# Patient Record
Sex: FEMALE | Race: WHITE
[De-identification: names, ages, dates, MRNs, and addresses within clinical notes are randomized per-mention and may not be internally consistent; named-entity substitution may affect disease eponyms.]

---

## 2017-04-04 ENCOUNTER — HOSPITAL ENCOUNTER (EMERGENCY)
Dept: HOSPITAL 75 - ER | Age: 32
Discharge: HOME | End: 2017-04-04
Payer: COMMERCIAL

## 2017-04-04 VITALS — BODY MASS INDEX: 25.68 KG/M2 | HEIGHT: 61 IN | WEIGHT: 136 LBS

## 2017-04-04 VITALS — SYSTOLIC BLOOD PRESSURE: 126 MMHG | DIASTOLIC BLOOD PRESSURE: 84 MMHG

## 2017-04-04 DIAGNOSIS — Z3A.01: ICD-10-CM

## 2017-04-04 DIAGNOSIS — O20.0: Primary | ICD-10-CM

## 2017-04-04 LAB
BILIRUB UR QL STRIP: NEGATIVE
KETONES UR QL STRIP: NEGATIVE
LEUKOCYTE ESTERASE UR QL STRIP: NEGATIVE
NITRITE UR QL STRIP: NEGATIVE
PH UR STRIP: 6 [PH] (ref 5–9)
PROT UR QL STRIP: NEGATIVE
SP GR UR STRIP: 1.02 (ref 1.02–1.02)
SQUAMOUS #/AREA URNS HPF: (no result) /HPF
UROBILINOGEN UR-MCNC: NORMAL MG/DL
WBC #/AREA URNS HPF: (no result) /HPF

## 2017-04-04 PROCEDURE — 81000 URINALYSIS NONAUTO W/SCOPE: CPT

## 2017-04-04 PROCEDURE — 84703 CHORIONIC GONADOTROPIN ASSAY: CPT

## 2017-04-04 PROCEDURE — 99282 EMERGENCY DEPT VISIT SF MDM: CPT

## 2017-04-04 PROCEDURE — 84702 CHORIONIC GONADOTROPIN TEST: CPT

## 2017-04-04 PROCEDURE — 36415 COLL VENOUS BLD VENIPUNCTURE: CPT

## 2017-04-04 NOTE — ED GU-FEMALE
General


Chief Complaint:  -Female


Stated Complaint:  POSS MISCARRIAGE


Nursing Triage Note:  


PT REPORTS SHE HAD A POS PREG TEST AT HOME.  BEGAN SPOTTING LAST NIGHT WITH 


HEAVIER BLEEDING UPON AWAKENING THIS MORNING.  ALSO C/O CRAMPING ET PELVIC 


HEAVINESS.


Nursing Sepsis Screen:  No Definite Risk


Source:  patient, RN notes reviewed


Exam Limitations:  no limitations





History of Present Illness


Time seen by provider:  04:28


Initial Comments


As above and below.


Timing/Duration:  this evening, getting worse


Severity/Quality:  mild, cramping


Location:  vaginal


Radiation:  none


Activities at Onset:  none


Prior Genitourinary Problems:  none


Sexual [place] History:  single partner


Modifying Factors:  Improves With Other (none)


Associated Symptoms:  abdominal pain





Allergies and Home Medications


Allergies


Coded Allergies:  


     latex (Unverified  Adverse Reaction, Unknown, 17)





Home Medications


Prenatal Vit #108/Iron/Fa 1 Each Tablet, 1 TAB PO DAILY, (Reported)





Constitutional:  see HPI


Genitourinary:  see HPI


Pregnant:  Yes


LMP:  Mar 3, 2017


All Other Systemes Reviewed


Negative Unless Noted:  Yes





Past Medical-Social-Family Hx


Patient Social History


Alcohol Use:  Denies Use


Recreational Drug Use:  No


Smoking Status:  Never a Smoker


Recent Foreign Travel:  No


Contact w/Someone Who Travel:  No


Recent Infectious Disease Expo:  No


Recent Hopitalizations:  No





Immunizations Up To Date


Tetanus Booster (TDap):  Less than 5yrs


Date of Influenza Vaccine:  Sep 12, 2016





Surgeries


HX Surgeries:  Yes (DENTAL)


Surgeries:  Adenoidectomy,  Section, Tonsillectomy





Respiratory


Hx Respiratory Disorders:  Yes


Respiratory Disorders:  Asthma





Cardiovascular


Hx Cardiac Disorders:  Yes (TRICUSPID/MITRAL VALVE REGURG)





Neurological


Hx Neurological Disorders:  No





Reproductive System


Hx :  3


Hx Para:  2


Hx Reproductive Disorders:  No


Sexually Transmitted Disease:  No


HIV/AIDS:  No





Genitourinary


Hx Genitourinary Disorders:  No





Gastrointestinal


Hx Gastrointestinal Disorders:  Yes (SPASTIC COLON)


Gastrointestinal Disorders:  Irritable Bowel





Musculoskeletal


Hx Musculoskeletal Disorders:  Yes (DEG MENISCAL DZ)





Endocrine


Hx Endocrine Disorders:  No





HEENT


HX ENT Disorders:  Yes (LASIK SURGERY)


Loss of Vision:  Denies


Hearing Impairment:  Denies





Cancer


Hx Cancer:  No





Psychosocial


Hx Psychiatric Problems:  Yes


Behavioral Health Disorders:  Anxiety, Depression





Integumentary


HX Skin/Integumentary Disorder:  No





Blood Transfusions


Hx Blood Disorders:  No


Adverse Reaction to a Blood Tr:  No





Family Medical History


Family Medial History:  


Cancer


  09 BROTHER (KIDNEY )


FAM HX-PSYCHIATRIC COND


  03 MOTHER


Family history: Allergy


  03 FATHER


Family history: Asthma


  09 BROTHER


Family history: Cardiovascular disease


  03 FATHER


Family history: Hypertension


  03 FATHER


Family history: Osteoporosis


  03 MOTHER


Headache


  03 MOTHER


History of - anemia


  03 MOTHER


Hypercholesterolemia


  03 FATHER





Physical Exam


Vital Signs





Vital Sign - Last 12Hours








 17





 04:15


 


Temp 99.0


 


Pulse 89


 


Resp 16


 


B/P (MAP) 145/99





Capillary Refill : Less Than 3 Seconds


General Appearance:  WD/WN, no apparent distress


Cardiovascular:  regular rate, rhythm


Respiratory:  no respiratory distress


Rectal:  deferred


Neurologic/Psychiatric:  no motor/sensory deficits, alert, oriented x 3





Progress/Results/Core Measures


Results/Orders


Lab Results





Laboratory Tests








Test


  17


04:24 17


04:35 Range/Units


 


 


Urine Color YELLOW    


 


Urine Clarity CLEAR    


 


Urine pH 6   5-9  


 


Urine Specific Gravity 1.020   1.016-1.022  


 


Urine Protein NEGATIVE   NEGATIVE  


 


Urine Glucose (UA) NEGATIVE   NEGATIVE  


 


Urine Ketones NEGATIVE   NEGATIVE  


 


Urine Nitrite NEGATIVE   NEGATIVE  


 


Urine Bilirubin NEGATIVE   NEGATIVE  


 


Urine Urobilinogen NORMAL   NORMAL  MG/DL


 


Urine Leukocyte Esterase NEGATIVE   NEGATIVE  


 


Urine RBC (Auto) 4+ H  NEGATIVE  


 


Urine RBC RARE    /HPF


 


Urine WBC RARE    /HPF


 


Urine Squamous Epithelial


Cells 0-2 


  


   /HPF


 


 


Urine Crystals NONE    /LPF


 


Urine Bacteria TRACE    /HPF


 


Urine Casts NONE    /LPF


 


Urine Mucus SMALL H   /LPF


 


Urine Culture Indicated NO    


 


Human Chorionic Gonadotropin,


Quant 


  336 H


  <5  MIU/ML


 








My Orders





Orders - FCO ELLER DO


Hcg,Quantitative (17 04:27)


Ua Culture If Indicated (17 04:27)


Urine Pregnancy Bedside (17 04:30)





Vital Signs/I&O





Vital Sign - Last 12Hours








 17





 04:15


 


Temp 99.0


 


Pulse 89


 


Resp 16


 


B/P (MAP) 145/99














Blood Pressure Mean:  114








Progress Note :  


Progress Note


With a HCG of only 339, nothing would show up on an US @ this time.  Has an 

appointment c/ her OB on Thursday which would be a perfect time to recheck her 

HCG.





Departure


Impression


Impression:  


 Primary Impression:  


 Threatened  in early pregnancy


Disposition:  01 HOME, SELF-CARE


Condition:  Stable





Departure-Patient Inst.


Decision time for Depature:  05:19


Referrals:  


SCOTT CORREA DO


Patient Instructions:  Threatened Miscarriage (DC)





Add. Discharge Instructions:  


All discharge instructions reviewed with patient and/or family. Voiced 

understanding.  KEEP YOUR CURRENTLY SCHEDULED OB APPOINTMENT FOR THURSDAY.











FCO ELLER DO 2017 04:29

## 2017-04-05 ENCOUNTER — HOSPITAL ENCOUNTER (OUTPATIENT)
Dept: HOSPITAL 75 - LABNPT | Age: 32
End: 2017-04-05
Attending: OBSTETRICS & GYNECOLOGY
Payer: COMMERCIAL

## 2017-04-05 DIAGNOSIS — Z53.9: Primary | ICD-10-CM

## 2017-05-07 NOTE — XMS REPORT
Continuity of Care Document

 Created on: 2017



LIDIA GRIGGS

External Reference #: [id number]

: 1985

Sex: Female



Demographics







 Address  [address] [place]  [address]

 

 Home Phone  [phone]

 

 Preferred Language  Unknown

 

 Marital Status  Unknown

 

 Yarsani Affiliation  Unknown

 

 Race  Unknown

 

 Ethnic Group  Unknown





Author







 Author  [facility]

 

 Organization  [facility]

 

 Address  Unknown

 

 Phone  Unavailable



                                                      



Allergies

                      





 Active                    Description                    Code                  
  Type                    Severity                    Reaction                  
  Onset                    Reported/Identified                    Relationship 
to Patient                    Clinical Status                

 

 Yes                    No Known Drug Allergies                    [id number]  
                  Drug Allergy                    Unknown                    N/
A                                         2013                           
                               

 

 Yes                    latex                    [id number]                    
Drug Allergy                    Unknown                    N/A                 
                        2017                                             
             



                                                                               
                   



Medications

                                                                               
         



Problems

                      





 Date Dx Coded                    Attending                    Type            
        Code                    Diagnosis                    Diagnosed By      
          

 

 02/10/2015                    DONALDO GARCIA DO                    Ot     
               719.46                                                          

 

 2015                    JOSE DONALDO NNUEZ                    Ot     
               719.46                                                          

 

 2015                    JOSE DODONALDO                    Ot     
               719.46                                                          

 

 2015                    JOSE DONALDO NUNEZ                    Ot     
               719.46                                                          

 

 2015                    JOSE DONALDO NUNEZ                    Ot     
               719.46                                                          

 

 2016                    JOSE DODONALDO                    Ot     
               719.46                                                          

 

 2016                    JOSE DONALDO NUNEZ                    Ot     
               719.46                    JOINT PAIN-L/LEG                      
               

 

 2016                    SCOTT CORRAE DO                    Ot      
              Z01.419                    ENCNTR FOR GYN EXAM (GENERAL) (ROUTINE
)                                      

 

 2017                    DONALDO GARCIA DO                    Ot     
               719.46                    JOINT PAIN-L/LEG                      
               

 

 2017                    SCOTT CORREA DO                    Ot      
              Z01.419                    ENCNTR FOR GYN EXAM (GENERAL) (ROUTINE
)                                      

 

 2017                    DONALDO GARCIA DO                    Ot     
               719.46                    JOINT PAIN-L/LEG                      
               

 

 2017                    SCOTT CORREA DO                    Ot      
              Z01.419                    ENCNTR FOR GYN EXAM (GENERAL) (ROUTINE
)                                      

 

 2017                    FCO ELLER DO                    Ot      
              O20.0                    THREATENED                      
                

 

 2017                    FCO ELLER DO                    Ot      
              Z3A.01                    LESS THAN 8 WEEKS GESTATION OF 
PREGNANCY                                     

 

 2017                    SCOTT CORREA DO                    Ot      
              Z01.419                    ENCNTR FOR GYN EXAM (GENERAL) (ROUTINE
)                                      

 

 2017                    FCO ELLER DO                    Ot      
              O20.0                    THREATENED                      
                

 

 2017                    FCO ELLER DO                    Ot      
              Z3A.01                    LESS THAN 8 WEEKS GESTATION OF 
PREGNANCY                                     

 

 2017                    SCOTT CORREA DO                    Ot      
              Z53.9                    PROCEDURE AND TREATMENT NOT CARRIED OUT,
                                     



                                                                               
                                                                               
                                                                               
                     



Procedures

                                                                               
         



Results

                      





 Test                    Result                    Range                









 Complete urinalysis with reflex to culture - 17 04:24                









 Urine color determination                    YELLOW                     NRG   
             

 

 Urine clarity determination                    CLEAR                     NRG  
              

 

 Urine pH measurement by test strip                    6                     5-
9                

 

 Specific gravity of urine by test strip                    1.020              
       1.016-1.022                

 

 Urine protein assay by test strip, semi-quantitative                    
NEGATIVE                     NEGATIVE                

 

 Urine glucose detection by automated test strip                    NEGATIVE   
                  NEGATIVE                

 

 Erythrocytes detection in urine sediment by light microscopy                  
  4+                     NEGATIVE                

 

 Urine ketones detection by automated test strip                    NEGATIVE   
                  NEGATIVE                

 

 Urine nitrite detection by test strip                    NEGATIVE             
        NEGATIVE                

 

 Urine total bilirubin detection by test strip                    NEGATIVE     
                NEGATIVE                

 

 Urine urobilinogen measurement by automated test strip (mass/volume)          
          NORMAL                     NORMAL                

 

 Urine leukocyte esterase detection by dipstick                    NEGATIVE    
                 NEGATIVE                

 

 Automated urine sediment erythrocyte count by microscopy (number/high power 
field)                    RARE                     NRG                

 

 Automated urine sediment leukocyte count by microscopy (number/high power field
)                    RARE                     NRG                

 

 Bacteria detection in urine sediment by light microscopy                    
TRACE                     NRG                

 

 Squamous epithelial cells detection in urine sediment by light microscopy     
               0-2                     NRG                

 

 Crystals detection in urine sediment by light microscopy                    
NONE                     NRG                

 

 Casts detection in urine sediment by light microscopy                    NONE 
                    NRG                

 

 Mucus detection in urine sediment by light microscopy                    SMALL
                     NRG                

 

 Complete urinalysis with reflex to culture                    NO              
       NRG                









 Serum or plasma choriogonadotropin measurement (units/volume) - 17 04:35
                









 Serum or plasma choriogonadotropin measurement (units/volume)                 
   336 m[iU]/mL                    <5                



                                                                               
         



Encounters

                      





 ACCT No.                    Visit Date/Time                    Discharge      
              Status                    Pt. Type                    Provider   
                 Facility                    Loc./Unit                    
Complaint                

 

 [id number]                    2017 04:11:00                    2017 05:24:00                    DIS                    Emergency              
      FCO ELLER DO                    [facility] 
                   ER                    [place]IS[place]                

 

 [id number]                    2015 14:28:00                    2015 23:59:59                    [facility]                    Outpatient             
       JOSE NUNEZ DONALDO STEVENSON                    [facility]                    RAD                    RT KNEE PAIN                

 

 [id number]                    10/02/2014 10:41:00                    10/02/
2014 23:59:59                    [facility]                    Outpatient             
                                                                               
        

 

 [id number]                    2014 09:46:00                    2014 23:59:59                    [facility]                    Outpatient             
                                                                               
        

 

 [id number]                    2014 06:00:00                    2014 11:50:00                    [facility]                    Inpatient              
                                                                               
       

 

 [id number]                    02/10/2014 08:16:00                    02/10/
2014 23:59:59                    [facility]                    Outpatient             
                                                                               
        

 

 [id number]                    2013 08:10:00                    2013 23:59:59                    [facility]                    Outpatient             
                                                                               
        

 

 [id number]                    2013 21:45:00                    2013 23:36:00                    DIS                    Emergency              
                                                                               
       

 

 [id number]                    2013 13:39:00                    2013 23:59:59                    [facility]                    Outpatient             
                                                                               
        

 

 [id number]                    2013 10:19:00                    2013 23:59:59                    [facility]                    Outpatient             
                                                                               
        

 

 [id number]                    05/10/2013 10:03:00                    05/10/
2013 23:59:59                    [facility]                    Outpatient             
                                                                               
        

 

 [id number]                    2017 13:28:00                           
              ACT                    Outpatient                    SCOTT CORREA DO                    [facility]                  
  LABNPT                                     

 

 [id number]                    2016 07:43:00                           
              ACT                    Outpatient                    SCOTT CORREA DO                    [facility]                  
  LAB                    [facility] EXAM

## 2017-07-20 ENCOUNTER — HOSPITAL ENCOUNTER (OUTPATIENT)
Dept: HOSPITAL 75 - LAB | Age: 32
End: 2017-07-20
Attending: OBSTETRICS & GYNECOLOGY
Payer: COMMERCIAL

## 2017-07-20 DIAGNOSIS — R63.5: ICD-10-CM

## 2017-07-20 DIAGNOSIS — Z13.220: ICD-10-CM

## 2017-07-20 DIAGNOSIS — Z13.1: Primary | ICD-10-CM

## 2017-07-20 LAB
ALBUMIN SERPL-MCNC: 4.3 GM/DL (ref 3.2–4.5)
ALT SERPL-CCNC: 62 U/L (ref 0–55)
ANION GAP SERPL CALC-SCNC: 10 MMOL/L (ref 5–14)
AST SERPL-CCNC: 34 U/L (ref 5–34)
BASOPHILS # BLD AUTO: 0 10^3/UL (ref 0–0.1)
BASOPHILS NFR BLD AUTO: 0 % (ref 0–10)
BILIRUB SERPL-MCNC: 0.7 MG/DL (ref 0.1–1)
BUN SERPL-MCNC: 20 MG/DL (ref 7–18)
BUN/CREAT SERPL: 25
CALCIUM SERPL-MCNC: 9.2 MG/DL (ref 8.5–10.1)
CHLORIDE SERPL-SCNC: 104 MMOL/L (ref 98–107)
CHOLEST SERPL-MCNC: 169 MG/DL (ref ?–200)
CO2 SERPL-SCNC: 23 MMOL/L (ref 21–32)
CREAT SERPL-MCNC: 0.8 MG/DL (ref 0.6–1.3)
EOSINOPHIL # BLD AUTO: 0.1 10^3/UL (ref 0–0.3)
EOSINOPHIL NFR BLD AUTO: 1 % (ref 0–10)
ERYTHROCYTE [DISTWIDTH] IN BLOOD BY AUTOMATED COUNT: 11.8 % (ref 10–14.5)
GFR SERPLBLD BASED ON 1.73 SQ M-ARVRAT: > 60 ML/MIN
GLUCOSE SERPL-MCNC: 86 MG/DL (ref 70–105)
LDLC SERPL DIRECT ASSAY-MCNC: 105 MG/DL (ref 1–129)
LYMPHOCYTES # BLD AUTO: 2 X 10^3 (ref 1–4)
LYMPHOCYTES NFR BLD AUTO: 39 % (ref 12–44)
MCH RBC QN AUTO: 30 PG (ref 25–34)
MCHC RBC AUTO-ENTMCNC: 34 G/DL (ref 32–36)
MCV RBC AUTO: 87 FL (ref 80–99)
MONOCYTES # BLD AUTO: 0.5 X 10^3 (ref 0–1)
MONOCYTES NFR BLD AUTO: 9 % (ref 0–12)
NEUTROPHILS # BLD AUTO: 2.6 X 10^3 (ref 1.8–7.8)
NEUTROPHILS NFR BLD AUTO: 50 % (ref 42–75)
PLATELET # BLD: 240 10^3/UL (ref 130–400)
PMV BLD AUTO: 10.3 FL (ref 7.4–10.4)
POTASSIUM SERPL-SCNC: 4.3 MMOL/L (ref 3.6–5)
PROT SERPL-MCNC: 7.1 GM/DL (ref 6.4–8.2)
RBC # BLD AUTO: 4.34 10^6/UL (ref 4.35–5.85)
SODIUM SERPL-SCNC: 137 MMOL/L (ref 135–145)
TRIGL SERPL-MCNC: 55 MG/DL (ref ?–150)
TSH SERPL-ACNC: 1.64 UIU/ML (ref 0.35–4.94)
VLDLC SERPL CALC-MCNC: 11 MG/DL (ref 5–40)
WBC # BLD AUTO: 5.2 10^3/UL (ref 4.3–11)

## 2017-07-20 PROCEDURE — 80053 COMPREHEN METABOLIC PANEL: CPT

## 2017-07-20 PROCEDURE — 83036 HEMOGLOBIN GLYCOSYLATED A1C: CPT

## 2017-07-20 PROCEDURE — 80061 LIPID PANEL: CPT

## 2017-07-20 PROCEDURE — 84443 ASSAY THYROID STIM HORMONE: CPT

## 2017-07-20 PROCEDURE — 85025 COMPLETE CBC W/AUTO DIFF WBC: CPT

## 2017-07-20 PROCEDURE — 36415 COLL VENOUS BLD VENIPUNCTURE: CPT

## 2018-06-15 ENCOUNTER — HOSPITAL ENCOUNTER (OUTPATIENT)
Dept: HOSPITAL 75 - RAD | Age: 33
End: 2018-06-15
Attending: OBSTETRICS & GYNECOLOGY
Payer: COMMERCIAL

## 2018-06-15 DIAGNOSIS — Z36.89: Primary | ICD-10-CM

## 2018-06-15 DIAGNOSIS — Z3A.20: ICD-10-CM

## 2018-06-15 PROCEDURE — 76805 OB US >/= 14 WKS SNGL FETUS: CPT

## 2018-06-15 NOTE — DIAGNOSTIC IMAGING REPORT
INDICATION: Fetal anatomy survey.



TECHNIQUE: Multiple real-time grayscale images were obtained over

the gravid uterus.



COMPARISON: None



FINDINGS: The cervix is closed and measures approximately 6 cm in

length. Placenta is posteriorly located, and there is no evidence

of previa. The fetus is in cephalic lie. The amount of amniotic

fluid appears visually appropriate. Due to advanced gestational

age, maternal adnexa are not well seen.



Fetal anatomy survey was performed, and the following structures

are visualized and normal: Cerebellum, cisterna magna, cerebral

ventricles, stomach, four-chamber heart, bladder, spine,

umbilical cord insertion, three-vessel cord, lips and nose, and

all four extremities.



Biometrical measurements are as follows:

Biparietal 4.7 cm, age 20 weeks 2 days.

Head circumference 17.67 cm, age 20 weeks 2 days.

Abdominal circumference 16.22 cm, age 21 weeks 3 days.

Femur length 3.54 cm, age 21 weeks 2 days.



Sonographic estimate age: 20 weeks 6 days.

Sonographic estimated date of delivery: 10/27/2018.



Estimated Fetal Weight: 399 gm (+/- 58 gm).

LMP percentile: 95%.



Fetal heart rate: 143 beats per minute.



Fetal number: 1 of 1.



IMPRESSION: 

1. Single live intrauterine pregnancy with normal fetal anatomy

survey.



Dictated by: 



  Dictated on workstation # [id number]

## 2018-10-12 ENCOUNTER — HOSPITAL ENCOUNTER (OUTPATIENT)
Dept: HOSPITAL 75 - LDRP | Age: 33
Discharge: HOME | End: 2018-10-12
Attending: OBSTETRICS & GYNECOLOGY
Payer: COMMERCIAL

## 2018-10-12 VITALS — BODY MASS INDEX: 30.97 KG/M2 | WEIGHT: 164.05 LBS | HEIGHT: 61 IN

## 2018-10-12 VITALS — SYSTOLIC BLOOD PRESSURE: 140 MMHG | DIASTOLIC BLOOD PRESSURE: 90 MMHG

## 2018-10-12 VITALS — DIASTOLIC BLOOD PRESSURE: 76 MMHG | SYSTOLIC BLOOD PRESSURE: 124 MMHG

## 2018-10-12 DIAGNOSIS — Z3A.36: ICD-10-CM

## 2018-10-12 DIAGNOSIS — O47.03: Primary | ICD-10-CM

## 2018-10-12 PROCEDURE — 96374 THER/PROPH/DIAG INJ IV PUSH: CPT

## 2018-10-12 PROCEDURE — 99214 OFFICE O/P EST MOD 30 MIN: CPT

## 2018-10-12 PROCEDURE — 96361 HYDRATE IV INFUSION ADD-ON: CPT

## 2018-10-22 ENCOUNTER — HOSPITAL ENCOUNTER (OUTPATIENT)
Dept: HOSPITAL 75 - PREOP | Age: 33
Discharge: HOME | End: 2018-10-22
Attending: OBSTETRICS & GYNECOLOGY
Payer: COMMERCIAL

## 2018-10-22 VITALS — HEIGHT: 61 IN | WEIGHT: 166 LBS | BODY MASS INDEX: 31.34 KG/M2

## 2018-10-22 DIAGNOSIS — Z01.818: Primary | ICD-10-CM

## 2018-10-22 PROCEDURE — 87081 CULTURE SCREEN ONLY: CPT

## 2018-10-26 ENCOUNTER — HOSPITAL ENCOUNTER (INPATIENT)
Dept: HOSPITAL 75 - LDRP | Age: 33
LOS: 5 days | Discharge: HOME | End: 2018-10-31
Attending: OBSTETRICS & GYNECOLOGY | Admitting: OBSTETRICS & GYNECOLOGY
Payer: COMMERCIAL

## 2018-10-26 VITALS — HEIGHT: 61 IN | BODY MASS INDEX: 31.53 KG/M2 | WEIGHT: 167 LBS

## 2018-10-26 DIAGNOSIS — Z3A.39: ICD-10-CM

## 2018-10-26 DIAGNOSIS — D62: ICD-10-CM

## 2018-10-26 DIAGNOSIS — Z80.41: ICD-10-CM

## 2018-10-26 DIAGNOSIS — O34.211: Primary | ICD-10-CM

## 2018-10-26 PROCEDURE — 36415 COLL VENOUS BLD VENIPUNCTURE: CPT

## 2018-10-26 PROCEDURE — 86850 RBC ANTIBODY SCREEN: CPT

## 2018-10-26 PROCEDURE — 88307 TISSUE EXAM BY PATHOLOGIST: CPT

## 2018-10-26 PROCEDURE — 88302 TISSUE EXAM BY PATHOLOGIST: CPT

## 2018-10-26 PROCEDURE — 85025 COMPLETE CBC W/AUTO DIFF WBC: CPT

## 2018-10-26 PROCEDURE — 86900 BLOOD TYPING SEROLOGIC ABO: CPT

## 2018-10-26 PROCEDURE — 94664 DEMO&/EVAL PT USE INHALER: CPT

## 2018-10-26 PROCEDURE — 86901 BLOOD TYPING SEROLOGIC RH(D): CPT

## 2018-10-29 VITALS — SYSTOLIC BLOOD PRESSURE: 127 MMHG | DIASTOLIC BLOOD PRESSURE: 78 MMHG

## 2018-10-29 VITALS — DIASTOLIC BLOOD PRESSURE: 85 MMHG | SYSTOLIC BLOOD PRESSURE: 124 MMHG

## 2018-10-29 VITALS — SYSTOLIC BLOOD PRESSURE: 123 MMHG | DIASTOLIC BLOOD PRESSURE: 78 MMHG

## 2018-10-29 VITALS — DIASTOLIC BLOOD PRESSURE: 83 MMHG | SYSTOLIC BLOOD PRESSURE: 134 MMHG

## 2018-10-29 VITALS — DIASTOLIC BLOOD PRESSURE: 69 MMHG | SYSTOLIC BLOOD PRESSURE: 122 MMHG

## 2018-10-29 LAB
BASOPHILS # BLD AUTO: 0 10^3/UL (ref 0–0.1)
BASOPHILS NFR BLD AUTO: 0 % (ref 0–10)
EOSINOPHIL # BLD AUTO: 0.1 10^3/UL (ref 0–0.3)
EOSINOPHIL NFR BLD AUTO: 1 % (ref 0–10)
ERYTHROCYTE [DISTWIDTH] IN BLOOD BY AUTOMATED COUNT: 12.8 % (ref 10–14.5)
HCT VFR BLD CALC: 35 % (ref 35–52)
HGB BLD-MCNC: 11.4 G/DL (ref 11.5–16)
LYMPHOCYTES # BLD AUTO: 2.4 X 10^3 (ref 1–4)
LYMPHOCYTES NFR BLD AUTO: 25 % (ref 12–44)
MANUAL DIFFERENTIAL PERFORMED BLD QL: NO
MCH RBC QN AUTO: 29 PG (ref 25–34)
MCHC RBC AUTO-ENTMCNC: 33 G/DL (ref 32–36)
MCV RBC AUTO: 87 FL (ref 80–99)
MONOCYTES # BLD AUTO: 1 X 10^3 (ref 0–1)
MONOCYTES NFR BLD AUTO: 11 % (ref 0–12)
NEUTROPHILS # BLD AUTO: 6.3 X 10^3 (ref 1.8–7.8)
NEUTROPHILS NFR BLD AUTO: 64 % (ref 42–75)
PLATELET # BLD: 222 10^3/UL (ref 130–400)
PMV BLD AUTO: 11.5 FL (ref 7.4–10.4)
RBC # BLD AUTO: 3.97 10^6/UL (ref 4.35–5.85)
WBC # BLD AUTO: 9.9 10^3/UL (ref 4.3–11)

## 2018-10-29 PROCEDURE — 0UT70ZZ RESECTION OF BILATERAL FALLOPIAN TUBES, OPEN APPROACH: ICD-10-PCS | Performed by: OBSTETRICS & GYNECOLOGY

## 2018-10-29 RX ADMIN — KETOROLAC TROMETHAMINE SCH MG: 30 INJECTION, SOLUTION INTRAMUSCULAR; INTRAVENOUS at 10:20

## 2018-10-29 RX ADMIN — DOCUSATE SODIUM SCH MG: 100 CAPSULE ORAL at 19:56

## 2018-10-29 RX ADMIN — HYDROCODONE BITARTRATE AND ACETAMINOPHEN PRN TAB: 5; 325 TABLET ORAL at 19:56

## 2018-10-29 RX ADMIN — HYDROCODONE BITARTRATE AND ACETAMINOPHEN PRN TAB: 5; 325 TABLET ORAL at 15:40

## 2018-10-29 RX ADMIN — KETOROLAC TROMETHAMINE SCH MG: 30 INJECTION, SOLUTION INTRAMUSCULAR; INTRAVENOUS at 22:04

## 2018-10-29 RX ADMIN — Medication SCH ML: at 22:04

## 2018-10-29 RX ADMIN — KETOROLAC TROMETHAMINE SCH MG: 30 INJECTION, SOLUTION INTRAMUSCULAR; INTRAVENOUS at 16:14

## 2018-10-29 RX ADMIN — DOCUSATE SODIUM SCH MG: 100 CAPSULE ORAL at 21:15

## 2018-10-29 RX ADMIN — HYDROCODONE BITARTRATE AND ACETAMINOPHEN PRN TAB: 5; 325 TABLET ORAL at 12:02

## 2018-10-29 NOTE — OPERATIVE REPORT
DATE OF SERVICE:  



PREOPERATIVE DIAGNOSES:

1.  A 33-year-old G4, P2 at 39 weeks and 2 days gestation.

2.  Previous  section x2.

3.  Family history of ovarian cancer.



POSTOPERATIVE DIAGNOSES:

1.  A 33-year-old G4, P2 at 39 weeks and 2 days gestation.

2.  Previous  section x2.

3.  Family history of ovarian cancer.



PROCEDURE:

Repeat low transverse  section with bilateral risk reducing

salpingectomy.



SURGEON:

Freddy Correa DO.



ANESTHESIA:

Spinal.



ESTIMATED BLOOD LOSS:

500 mL.



URINE OUTPUT:

100 mL clear at the end of the procedure.



FLUIDS:

1500 mL lactated Ringer's solution.



FINDINGS:

A live female infant weighing 8 pounds 7 ounces, Apgars 7 and 9.  Grossly normal

appearing bilateral ovaries and fallopian tubes.



SPECIMENS SENT:

Bilateral fallopian tubes and placenta.



INDICATION FOR PROCEDURE:

This 33-year-old female patient was seen in my office for prenatal care.  She

had seen me throughout the entirety of her prenatal care.  During her prenatal

care, we discussed risk reducing salpingectomy due to a family history of

ovarian cancer.  We discussed that we should wait to do this until her

childbearing is complete.  At this point, she is happy with her family being

complete.  We discussed risk of  throughout her prenatal care.  All of

her questions were answered as far as pre and postoperative expectations.  We

discussed the risk reducing salpingectomy and how it does not change the

recovery timeframe nor any further concerns; however, she would lose long-term

ability to conceive.  After all of her questions were answered again in the

preoperative area, consent was reviewed with the patient as well as risk and

postoperative expectations.  After all of her questions were answered, consent

was obtained.  The patient was taken to the operating room.



OPERATIVE REPORT IN DETAIL:

Once in the operating room, spinal analgesia was found to be adequate.  She was

placed in the supine position with a leftward tilt, prepped and draped in normal

sterile fashion.  A Pfannenstiel skin incision was then made through the

previously existing scar using a knife after anesthesia was tested and timeout

was performed.  My incision was then taken down to the underlying fascia using

Bovie cautery.  Fascial incision was extended laterally using Bovie cautery. 

Superior aspect of the fascial incision was then grasped with Kocher clamps,

tented up and dissected off the underlying rectus muscles.  The inferior aspect

of the fascial incision was then grasped with Kocher clamps, tented up and

dissected off the underlying rectus muscles.  The rectus muscles were then

dissected down the midline using Johnson scissors.  We exposed the peritoneum,

which I entered bluntly and extended using blunt traction.  The Elvis ring

retractor was placed in the peritoneal incision, which offered excellent lateral

sidewall retraction.  I then proceeded with making a low transverse incision

into the vesicouterine peritoneum and bluntly dissected off the lower uterine

segment, creating a bladder flap and then proceeded myotomy until membranes were

visualized, at which point I extended the uterine incision laterally and

superiorly using bandage scissors.  Amniotomy was performed using Allis clamp. 

Meconium stained fluid was noted at the time of rupture.  The infant was found

in vertex presentation.  With gentle fundal pressure, the infant's head was

elevated up to the incision where it was delivered.  The nares and oropharynx

were then bulb suctioned.  Nuchal cord reduced x1.  Anterior and posterior

shoulders were delivered.  Infant was then brought to the operative field where

the cord was doubly clamped and cut and infant was handed off to waiting nurses

in attendance.  Cord blood was collected.  Three-vessel cord was intact. 

Placenta was delivered spontaneously thereafter.  IV Pitocin was initiated to

facilitate uterine contraction.  Fundus became firmer with bimanual massage. 

The uterus was then exteriorized and cleared of all endometrial clots and

debris.  I then proceeded to close the uterine incision using 0 Vicryl suture in

a running locked fashion.  A second layer of imbricating 0 Monocryl was placed. 

Excellent hemostasis was noted after doing this.  I then took my attention to

the fallopian tubes where bilaterally I performed this dissection.  During the

proximal isthmic portion of the fallopian tube, I take a LigaSure impact and

bipolar cauterized and transected using the LigaSure.  I then took this down to

mesosalpinx amputating the fallopian tube from its surrounding blood supply all

the way down to the distal ampullary connection point.  Once this was done, I

performed this on the contralateral side and both of the tubes were sent for

pathology specimen.  There was no active bleeding noted from any of my

dissection planes.  I placed the uterus back within the pelvis and copiously

irrigated the pelvis using normal saline.  Once again, there was no active

bleeding noted from any of my dissection planes.  I then placed Interceed

antiadhesive over my low transverse incision and proceeded with closing the

peritoneum using 3-0 Vicryl suture in a running fashion.  The rectus muscle was

reapproximated using 3-0 Vicryl suture in interrupted fashion.  The fascia was

reapproximated using 0 Vicryl suture in running fashion.  Subcutaneous tissue

was reapproximated using 3-0 plain in interrupted subcutaneous stitch and skin

reapproximated using 4-0 Monocryl in a running subcuticular.  Dermabond was

applied to the incision and sterile dressing with adhesive white tape.  The

patient tolerated the procedure well and sent to recovery area in stable

condition.  Lap and sponge counts were correct at the end of the procedure. 

Instrument counts were correct as well.  Two grams of Ancef was given

preoperatively for infection prophylaxis.





Job ID: [id number]

DocumentID: [id number]

Dictated Date:  10/29/2018 08:34:22

Transcription Date: 10/29/2018 14:22:33

Dictated By: FREDDY CORREA DO

## 2018-10-29 NOTE — DISCHARGE INST-WOMEN'S SERVICE
Discharge Inst-Women's Serv


Depart Medication/Instructions


New, Converted or Re-Newed RX:  RX on Chart


Final Diagnosis


POD 2 RLTCS w/ RRS





Consults/Follow Up


Additional Follow Up:  Yes


Orders/Referrals


Dr. Bermudez in 7-10 days and in 6 weeks





Activity


Activity:  Activity as Tolerated


Driving Instructions:  No Driving for 1 Week


NO SMOKING:  NO SMOKING


Nothing Inside Vagina:  No Douching, No [place], No Tampons





Diet


Discharge Diet:  No Restrictions


Symptoms to Report to :  Bleeding Excessive, Pain Increased, Fever Over 101 

Degrees F, Vaginal Bleeding Increase, Questions/Concerns


For Any Problems or Questions:  Contact Your Physician





Skin/Wound Care


Infection Signs and Symptoms:  Increased Redness, Foul Odor of Wound, Increased 

Drainage, Skin Itchy or Has a Rash, Increased Swelling, Temperature Above 101  F


Operative Area Clean and Dry:  Keep Incision Clean/Dry


Stitches/Staples/Dermabond:  Dermabond, Care of Stitches


Bathing Instructions:  SCOTT Quintana DO Oct 29, 2018 7:26 am

## 2018-10-29 NOTE — HISTORY & PHYSICAL-OB
OB - Chief Complaint & HPI


Date/Time


Date of Admission:


Date of Admission:  Oct 29, 2018 at 6:00 am


Date seen by a Provider:  Oct 29, 2018


Time Seen by a Provider:  07:10





Chief Complaint/History


OB-Reason for Admission/Chief:   Section


Hx :  4


Hx Para:  2


Expected Date of Delivery:  2018


Gestational Age in Weeks:  39


Gestational Age in Days:  3


Indication for :  desires repeat 


Admission Nurse Assessment Rev:  Yes


History of Labs


O pos


Antibody neg


RI


RPR NR


HBsAg NR


HIV NR


GC neg


GBS neg





Allergies and Home Medications


Allergies


Coded Allergies:  


     latex (Unverified  Adverse Reaction, Unknown, 17)





Home Medications


Famotidine 20 Mg Tablet, 20 MG PO BID, (Reported)


Prenatal Vit #108/Iron/FA 1 Each Tablet, 1 EACH PO DAILY, (Reported)





Patient Home Medication List


Home Medication List Reviewed:  Yes





OB - History


Hx of Present Pregnancy


Prenatal Care:  Yes


Ultrasounds:  Normal mid trimester US


Obstetrical Complications:  None


Medical Complications:  None





Obstetrical History


Hx Pregnancy Termination:  No


Hx Multiple Gestation:  No


Hx Stillbirth:  No


Hx Pregnancy Complication:  No


Hx Pregnancy Induced Hypertens:  No


Hx Maternal Gestational Diabet:  No





Delivery History


Hx Dystocia:  No


Hx Large For Gestational Age I:  No


Hx Small for Gestational Age I:  No


Hx  Section:  Yes


Hx Vaginal Delivery Post C-Sec:  No


Hx Blood Disorders:  No


Adverse Rxn to Tranfusion:  No





Patient Past Medical History





n/a





Social History/Family History


HIV/AIDS:  No


Recent Infectious Disease Expo:  No


Sexually Transmitted Disease:  No


Alcohol Use:  Denies Use


Recreational Drug Use:  No





Immunizations


Tetanus Booster (TDap):  Less than 5yrs


Date of Influenza Vaccine:  Sep 28, 2018





OB - Admission Exam


Physical Exam


Vitals:





Vital Signs








 10/29/18





 06:10


 


Temp 97.9


 


Pulse 86


 


Resp 16


 


B/P (MAP) 124/85 (98)


 


O2 Delivery Room Air








HEENT:  NCAT


Heart:  Rhythm Normal


Lungs:  Clear


Abdomen:  Gravid


Extremities:  Normal


Reflexes:  Normal


Membranes:  Intact


Fetal Heart Rate:  150's


Accelerations:  Accelerations Present


Decelerations:  No Decelerations


Short Term Variability:  Present


Long Term Variability:  Average (6-25)


Contractions on Admission:  >10 Minutes Apart


Intensity:  Mild


Labs





Laboratory Tests








Test


 10/29/18


06:25 Range/Units


 


 


White Blood Count


 9.9 


 4.3-11.0


10^3/uL


 


Red Blood Count


 3.97 L


 4.35-5.85


10^6/uL


 


Hemoglobin 11.4 L 11.5-16.0  G/DL


 


Hematocrit 35  35-52  %


 


Mean Corpuscular Volume 87  80-99  FL


 


Mean Corpuscular Hemoglobin 29  25-34  PG


 


Mean Corpuscular Hemoglobin


Concent 33 


 32-36  G/DL





 


Red Cell Distribution Width 12.8  10.0-14.5  %


 


Platelet Count


 222 


 130-400


10^3/uL


 


Mean Platelet Volume 11.5 H 7.4-10.4  FL


 


Neutrophils (%) (Auto) 64  42-75  %


 


Lymphocytes (%) (Auto) 25  12-44  %


 


Monocytes (%) (Auto) 11  0-12  %


 


Eosinophils (%) (Auto) 1  0-10  %


 


Basophils (%) (Auto) 0  0-10  %


 


Neutrophils # (Auto) 6.3  1.8-7.8  X 10^3


 


Lymphocytes # (Auto) 2.4  1.0-4.0  X 10^3


 


Monocytes # (Auto) 1.0  0.0-1.0  X 10^3


 


Eosinophils # (Auto)


 0.1 


 0.0-0.3


10^3/uL


 


Basophils # (Auto)


 0.0 


 0.0-0.1


10^3/uL











OB - Assessment/Plan/Diagnosis


Assessment


Assessment:   section


Admission Dx


32 yo  @ 39 weeks


RLTCS


Family history of ovarian ca


Admission Status:  Inpatient Order (span 2 midnights)


Reason for Inpatient Admission:  


Repeat 





Plan


Plan:   Section











SCOTT CORREA DO Oct 29, 2018 7:24 am

## 2018-10-30 VITALS — DIASTOLIC BLOOD PRESSURE: 79 MMHG | SYSTOLIC BLOOD PRESSURE: 129 MMHG

## 2018-10-30 VITALS — DIASTOLIC BLOOD PRESSURE: 84 MMHG | SYSTOLIC BLOOD PRESSURE: 140 MMHG

## 2018-10-30 VITALS — DIASTOLIC BLOOD PRESSURE: 86 MMHG | SYSTOLIC BLOOD PRESSURE: 136 MMHG

## 2018-10-30 VITALS — DIASTOLIC BLOOD PRESSURE: 72 MMHG | SYSTOLIC BLOOD PRESSURE: 129 MMHG

## 2018-10-30 VITALS — SYSTOLIC BLOOD PRESSURE: 122 MMHG | DIASTOLIC BLOOD PRESSURE: 65 MMHG

## 2018-10-30 LAB
BASOPHILS # BLD AUTO: 0 10^3/UL (ref 0–0.1)
BASOPHILS NFR BLD AUTO: 0 % (ref 0–10)
EOSINOPHIL # BLD AUTO: 0.1 10^3/UL (ref 0–0.3)
EOSINOPHIL NFR BLD AUTO: 1 % (ref 0–10)
ERYTHROCYTE [DISTWIDTH] IN BLOOD BY AUTOMATED COUNT: 12.8 % (ref 10–14.5)
HCT VFR BLD CALC: 30 % (ref 35–52)
HGB BLD-MCNC: 10 G/DL (ref 11.5–16)
LYMPHOCYTES # BLD AUTO: 2.7 X 10^3 (ref 1–4)
LYMPHOCYTES NFR BLD AUTO: 22 % (ref 12–44)
MANUAL DIFFERENTIAL PERFORMED BLD QL: NO
MCH RBC QN AUTO: 29 PG (ref 25–34)
MCHC RBC AUTO-ENTMCNC: 33 G/DL (ref 32–36)
MCV RBC AUTO: 88 FL (ref 80–99)
MONOCYTES # BLD AUTO: 1.2 X 10^3 (ref 0–1)
MONOCYTES NFR BLD AUTO: 9 % (ref 0–12)
NEUTROPHILS # BLD AUTO: 8.3 X 10^3 (ref 1.8–7.8)
NEUTROPHILS NFR BLD AUTO: 68 % (ref 42–75)
PLATELET # BLD: 191 10^3/UL (ref 130–400)
PMV BLD AUTO: 10.6 FL (ref 7.4–10.4)
RBC # BLD AUTO: 3.41 10^6/UL (ref 4.35–5.85)
WBC # BLD AUTO: 12.3 10^3/UL (ref 4.3–11)

## 2018-10-30 RX ADMIN — Medication SCH ML: at 04:04

## 2018-10-30 RX ADMIN — KETOROLAC TROMETHAMINE SCH MG: 30 INJECTION, SOLUTION INTRAMUSCULAR; INTRAVENOUS at 04:03

## 2018-10-30 RX ADMIN — Medication SCH ML: at 02:36

## 2018-10-30 RX ADMIN — IBUPROFEN SCH MG: 600 TABLET ORAL at 10:32

## 2018-10-30 RX ADMIN — DOCUSATE SODIUM SCH MG: 100 CAPSULE ORAL at 08:12

## 2018-10-30 RX ADMIN — IBUPROFEN SCH MG: 600 TABLET ORAL at 23:35

## 2018-10-30 RX ADMIN — HYDROCODONE BITARTRATE AND ACETAMINOPHEN PRN TAB: 5; 325 TABLET ORAL at 02:27

## 2018-10-30 RX ADMIN — HYDROCODONE BITARTRATE AND ACETAMINOPHEN PRN TAB: 5; 325 TABLET ORAL at 16:38

## 2018-10-30 RX ADMIN — HYDROCODONE BITARTRATE AND ACETAMINOPHEN PRN TAB: 5; 325 TABLET ORAL at 08:12

## 2018-10-30 RX ADMIN — DOCUSATE SODIUM SCH MG: 100 CAPSULE ORAL at 20:44

## 2018-10-30 RX ADMIN — IBUPROFEN SCH MG: 600 TABLET ORAL at 17:34

## 2018-10-30 RX ADMIN — HYDROCODONE BITARTRATE AND ACETAMINOPHEN PRN TAB: 5; 325 TABLET ORAL at 20:44

## 2018-10-30 NOTE — POSTPARTUM PROGRESS NOTE
Postpartum Note


Postpartum Note


Postpartum Day # 1





Subjective:


Patient is without complaints. Ambulating, voiding. Tolerating a regular diet 

without nausea or vomiting. Normal lochia. Pain is well controlled with oral 

pain medications. 





Objective:





Vital Sign - Last 24 Hours








 10/29/18 10/29/18 10/29/18 10/29/18





 11:30 16:00 20:00 23:50


 


Temp 98.2 98.8 98.9 98.9


 


Pulse 68 84 82 70


 


Resp 20 18 18 18


 


B/P (MAP) 134/83 (100) 127/78 (94) 122/69 (86) 123/78 (93)


 


Pulse Ox  96 96 96


 


O2 Delivery Room Air Room Air  


 


    





 10/30/18 10/30/18  





 04:05 08:16  


 


Temp 98.5 98.5  


 


Pulse 88 89  


 


Resp 18 20  


 


B/P (MAP) 140/84 (102) 136/86 (103)  


 


Pulse Ox 96 96  














Intake and Output   


 


 10/29/18 10/29/18 10/30/18





 15:00 23:00 07:00


 


Intake Total 2170 ml  1700 ml


 


Output Total 900 ml  1100 ml


 


Balance 1270 ml  600 ml











Physical Exam:


General - Alert and oriented, no apparent distress


Abdomen - Soft, appropriately tender to palpation, non-distended, fundus firm 

at umbilicus


Extremities - no edema, negative Esther's bilaterally 


Incision- c/d/i





Assessment:


POD 1 RLTCS w/ RRS


Acute blood loss anemia








Plan:


Routine postpartum care.


Encourage breast feeding.


Encourage ambulation.


Ferrous sulfate supplementation.


Plan for discharge tomorrow





Vitals - Labs


Vital Signs - I&O





Vital Signs








  Date Time  Temp Pulse Resp B/P (MAP) Pulse Ox O2 Delivery O2 Flow Rate FiO2


 


10/30/18 08:16 98.5 89 20 136/86 (103) 96   


 


10/30/18 04:05 98.5 88 18 140/84 (102) 96   


 


10/29/18 23:50 98.9 70 18 123/78 (93) 96   


 


10/29/18 20:00 98.9 82 18 122/69 (86) 96   


 


10/29/18 16:00 98.8 84 18 127/78 (94) 96 Room Air  


 


10/29/18 11:30 98.2 68 20 134/83 (100)  Room Air  














I & O 


 


 10/30/18





 07:00


 


Intake Total 3870 ml


 


Output Total 2000 ml


 


Balance 1870 ml











Labs


Laboratory Tests


10/30/18 05:50: 


White Blood Count 12.3H, Red Blood Count 3.41L, Hemoglobin 10.0L, Hematocrit 30L

, Mean Corpuscular Volume 88, Mean Corpuscular Hemoglobin 29, Mean Corpuscular 

Hemoglobin Concent 33, Red Cell Distribution Width 12.8, Platelet Count 191, 

Mean Platelet Volume 10.6H, Neutrophils (%) (Auto) 68, Lymphocytes (%) (Auto) 22

, Monocytes (%) (Auto) 9, Eosinophils (%) (Auto) 1, Basophils (%) (Auto) 0, 

Neutrophils # (Auto) 8.3H, Lymphocytes # (Auto) 2.7, Monocytes # (Auto) 1.2H, 

Eosinophils # (Auto) 0.1, Basophils # (Auto) 0.0











SCOTT CORREA DO Oct 30, 2018 8:26 am

## 2018-10-31 VITALS — DIASTOLIC BLOOD PRESSURE: 77 MMHG | SYSTOLIC BLOOD PRESSURE: 125 MMHG

## 2018-10-31 VITALS — DIASTOLIC BLOOD PRESSURE: 78 MMHG | SYSTOLIC BLOOD PRESSURE: 129 MMHG

## 2018-10-31 RX ADMIN — IBUPROFEN SCH MG: 600 TABLET ORAL at 11:53

## 2018-10-31 RX ADMIN — IBUPROFEN SCH MG: 600 TABLET ORAL at 05:59

## 2018-10-31 RX ADMIN — HYDROCODONE BITARTRATE AND ACETAMINOPHEN PRN TAB: 5; 325 TABLET ORAL at 09:47

## 2018-10-31 RX ADMIN — DOCUSATE SODIUM SCH MG: 100 CAPSULE ORAL at 09:45

## 2021-04-08 ENCOUNTER — HOSPITAL ENCOUNTER (OUTPATIENT)
Dept: HOSPITAL 75 - LAB | Age: 36
End: 2021-04-08
Attending: FAMILY MEDICINE
Payer: COMMERCIAL

## 2021-04-08 DIAGNOSIS — Z00.00: Primary | ICD-10-CM

## 2021-04-08 DIAGNOSIS — R53.83: ICD-10-CM

## 2021-04-08 LAB
ALBUMIN SERPL-MCNC: 4.4 GM/DL (ref 3.2–4.5)
ALP SERPL-CCNC: 67 U/L (ref 40–136)
ALT SERPL-CCNC: 25 U/L (ref 0–55)
BASOPHILS # BLD AUTO: 0 10^3/UL (ref 0–0.1)
BASOPHILS NFR BLD AUTO: 1 % (ref 0–10)
BILIRUB SERPL-MCNC: 0.5 MG/DL (ref 0.1–1)
BUN/CREAT SERPL: 11
CALCIUM SERPL-MCNC: 9.1 MG/DL (ref 8.5–10.1)
CHLORIDE SERPL-SCNC: 105 MMOL/L (ref 98–107)
CHOLEST SERPL-MCNC: 154 MG/DL (ref ?–200)
CO2 SERPL-SCNC: 23 MMOL/L (ref 21–32)
CREAT SERPL-MCNC: 0.87 MG/DL (ref 0.6–1.3)
EOSINOPHIL # BLD AUTO: 0.1 10^3/UL (ref 0–0.3)
EOSINOPHIL NFR BLD AUTO: 1 % (ref 0–10)
GFR SERPLBLD BASED ON 1.73 SQ M-ARVRAT: > 60 ML/MIN
GLUCOSE SERPL-MCNC: 93 MG/DL (ref 70–105)
HCT VFR BLD CALC: 38 % (ref 35–52)
HDLC SERPL-MCNC: 61 MG/DL (ref 40–60)
HGB BLD-MCNC: 12.8 G/DL (ref 11.5–16)
LYMPHOCYTES # BLD AUTO: 2.1 10^3/UL (ref 1–4)
LYMPHOCYTES NFR BLD AUTO: 27 % (ref 12–44)
MANUAL DIFFERENTIAL PERFORMED BLD QL: NO
MCH RBC QN AUTO: 31 PG (ref 25–34)
MCHC RBC AUTO-ENTMCNC: 34 G/DL (ref 32–36)
MCV RBC AUTO: 91 FL (ref 80–99)
MONOCYTES # BLD AUTO: 0.5 10^3/UL (ref 0–1)
MONOCYTES NFR BLD AUTO: 7 % (ref 0–12)
NEUTROPHILS # BLD AUTO: 4.8 10^3/UL (ref 1.8–7.8)
NEUTROPHILS NFR BLD AUTO: 64 % (ref 42–75)
PLATELET # BLD: 264 10^3/UL (ref 130–400)
PMV BLD AUTO: 10.9 FL (ref 9–12.2)
POTASSIUM SERPL-SCNC: 4.1 MMOL/L (ref 3.6–5)
PROT SERPL-MCNC: 7.1 GM/DL (ref 6.4–8.2)
SODIUM SERPL-SCNC: 140 MMOL/L (ref 135–145)
T4 FREE SERPL-MCNC: 1.06 NG/DL (ref 0.7–1.48)
TRIGL SERPL-MCNC: 74 MG/DL (ref ?–150)
VLDLC SERPL CALC-MCNC: 15 MG/DL (ref 5–40)
WBC # BLD AUTO: 7.6 10^3/UL (ref 4.3–11)

## 2021-04-08 PROCEDURE — 85025 COMPLETE CBC W/AUTO DIFF WBC: CPT

## 2021-04-08 PROCEDURE — 80061 LIPID PANEL: CPT

## 2021-04-08 PROCEDURE — 84439 ASSAY OF FREE THYROXINE: CPT

## 2021-04-08 PROCEDURE — 36415 COLL VENOUS BLD VENIPUNCTURE: CPT

## 2021-04-08 PROCEDURE — 80053 COMPREHEN METABOLIC PANEL: CPT

## 2021-04-08 PROCEDURE — 84443 ASSAY THYROID STIM HORMONE: CPT

## 2021-05-14 ENCOUNTER — HOSPITAL ENCOUNTER (OUTPATIENT)
Dept: HOSPITAL 75 - LAB | Age: 36
End: 2021-05-14
Attending: FAMILY MEDICINE
Payer: COMMERCIAL

## 2021-05-14 DIAGNOSIS — E03.9: Primary | ICD-10-CM

## 2021-05-14 DIAGNOSIS — R53.83: ICD-10-CM

## 2021-05-14 LAB — T4 FREE SERPL-MCNC: 1.08 NG/DL (ref 0.7–1.48)

## 2021-05-14 PROCEDURE — 84439 ASSAY OF FREE THYROXINE: CPT

## 2021-05-14 PROCEDURE — 84144 ASSAY OF PROGESTERONE: CPT

## 2021-05-14 PROCEDURE — 83001 ASSAY OF GONADOTROPIN (FSH): CPT

## 2021-05-14 PROCEDURE — 82670 ASSAY OF TOTAL ESTRADIOL: CPT

## 2021-05-14 PROCEDURE — 83002 ASSAY OF GONADOTROPIN (LH): CPT

## 2021-05-14 PROCEDURE — 84443 ASSAY THYROID STIM HORMONE: CPT

## 2021-05-14 PROCEDURE — 36415 COLL VENOUS BLD VENIPUNCTURE: CPT

## 2022-05-24 ENCOUNTER — HOSPITAL ENCOUNTER (OUTPATIENT)
Dept: HOSPITAL 75 - LAB | Age: 37
End: 2022-05-24
Attending: FAMILY MEDICINE
Payer: COMMERCIAL

## 2022-05-24 DIAGNOSIS — E03.8: ICD-10-CM

## 2022-05-24 DIAGNOSIS — Z00.00: Primary | ICD-10-CM

## 2022-05-24 LAB
ALBUMIN SERPL-MCNC: 4.3 GM/DL (ref 3.2–4.5)
ALP SERPL-CCNC: 68 U/L (ref 40–136)
ALT SERPL-CCNC: 17 U/L (ref 0–55)
BASOPHILS # BLD AUTO: 0 10^3/UL (ref 0–0.1)
BASOPHILS NFR BLD AUTO: 1 % (ref 0–10)
BILIRUB SERPL-MCNC: 0.5 MG/DL (ref 0.1–1)
BUN/CREAT SERPL: 16
CALCIUM SERPL-MCNC: 9.3 MG/DL (ref 8.5–10.1)
CHLORIDE SERPL-SCNC: 104 MMOL/L (ref 98–107)
CHOLEST SERPL-MCNC: 158 MG/DL (ref ?–200)
CO2 SERPL-SCNC: 23 MMOL/L (ref 21–32)
CREAT SERPL-MCNC: 0.88 MG/DL (ref 0.6–1.3)
EOSINOPHIL # BLD AUTO: 0.1 10^3/UL (ref 0–0.3)
EOSINOPHIL NFR BLD AUTO: 2 % (ref 0–10)
GFR SERPLBLD BASED ON 1.73 SQ M-ARVRAT: 86 ML/MIN
GLUCOSE SERPL-MCNC: 90 MG/DL (ref 70–105)
HCT VFR BLD CALC: 38 % (ref 35–52)
HDLC SERPL-MCNC: 65 MG/DL (ref 40–60)
HGB BLD-MCNC: 13 G/DL (ref 11.5–16)
LYMPHOCYTES # BLD AUTO: 1.9 10^3/UL (ref 1–4)
LYMPHOCYTES NFR BLD AUTO: 28 % (ref 12–44)
MANUAL DIFFERENTIAL PERFORMED BLD QL: NO
MCH RBC QN AUTO: 31 PG (ref 25–34)
MCHC RBC AUTO-ENTMCNC: 34 G/DL (ref 32–36)
MCV RBC AUTO: 90 FL (ref 80–99)
MONOCYTES # BLD AUTO: 0.6 10^3/UL (ref 0–1)
MONOCYTES NFR BLD AUTO: 9 % (ref 0–12)
NEUTROPHILS # BLD AUTO: 4.2 10^3/UL (ref 1.8–7.8)
NEUTROPHILS NFR BLD AUTO: 61 % (ref 42–75)
PLATELET # BLD: 261 10^3/UL (ref 130–400)
PMV BLD AUTO: 10.4 FL (ref 9–12.2)
POTASSIUM SERPL-SCNC: 4 MMOL/L (ref 3.6–5)
PROT SERPL-MCNC: 7 GM/DL (ref 6.4–8.2)
SODIUM SERPL-SCNC: 138 MMOL/L (ref 135–145)
T4 FREE SERPL-MCNC: 1.14 NG/DL (ref 0.7–1.48)
TRIGL SERPL-MCNC: 62 MG/DL (ref ?–150)
VLDLC SERPL CALC-MCNC: 12 MG/DL (ref 5–40)
WBC # BLD AUTO: 6.8 10^3/UL (ref 4.3–11)

## 2022-05-24 PROCEDURE — 85025 COMPLETE CBC W/AUTO DIFF WBC: CPT

## 2022-05-24 PROCEDURE — 36415 COLL VENOUS BLD VENIPUNCTURE: CPT

## 2022-05-24 PROCEDURE — 84439 ASSAY OF FREE THYROXINE: CPT

## 2022-05-24 PROCEDURE — 80053 COMPREHEN METABOLIC PANEL: CPT

## 2022-05-24 PROCEDURE — 84443 ASSAY THYROID STIM HORMONE: CPT

## 2022-05-24 PROCEDURE — 80061 LIPID PANEL: CPT

## 2022-09-26 ENCOUNTER — HOSPITAL ENCOUNTER (OUTPATIENT)
Dept: HOSPITAL 75 - LAB | Age: 37
End: 2022-09-26
Attending: FAMILY MEDICINE
Payer: COMMERCIAL

## 2022-09-26 DIAGNOSIS — E07.81: Primary | ICD-10-CM

## 2022-09-26 LAB
MAGNESIUM SERPL-MCNC: 2 MG/DL (ref 1.6–2.4)
T4 FREE SERPL-MCNC: 0.95 NG/DL (ref 0.7–1.48)

## 2022-09-26 PROCEDURE — 83735 ASSAY OF MAGNESIUM: CPT

## 2022-09-26 PROCEDURE — 36415 COLL VENOUS BLD VENIPUNCTURE: CPT

## 2022-09-26 PROCEDURE — 84439 ASSAY OF FREE THYROXINE: CPT

## 2022-09-26 PROCEDURE — 84443 ASSAY THYROID STIM HORMONE: CPT

## 2022-09-26 PROCEDURE — 82607 VITAMIN B-12: CPT
